# Patient Record
Sex: MALE | Race: OTHER | Employment: FULL TIME | URBAN - NONMETROPOLITAN AREA
[De-identification: names, ages, dates, MRNs, and addresses within clinical notes are randomized per-mention and may not be internally consistent; named-entity substitution may affect disease eponyms.]

---

## 2021-07-21 ENCOUNTER — TELEPHONE (OUTPATIENT)
Dept: PRIMARY CARE CLINIC | Age: 46
End: 2021-07-21

## 2021-07-21 NOTE — TELEPHONE ENCOUNTER
Received call from [address] from [facility] stating that this patient has an MRI scheduled. He has never been seen here and there is no order from you or anyone else in the practice PA department is needing approval of course. Please advise how this should be handled.

## 2021-07-22 ENCOUNTER — TELEMEDICINE (OUTPATIENT)
Dept: PRIMARY CARE CLINIC | Age: 46
End: 2021-07-22
Payer: COMMERCIAL

## 2021-07-22 DIAGNOSIS — G89.29 CHRONIC LUMBOSACRAL PAIN: Primary | ICD-10-CM

## 2021-07-22 DIAGNOSIS — M54.17 LUMBOSACRAL RADICULOPATHY: ICD-10-CM

## 2021-07-22 DIAGNOSIS — M54.50 CHRONIC LUMBOSACRAL PAIN: Primary | ICD-10-CM

## 2021-07-22 PROCEDURE — 99203 OFFICE O/P NEW LOW 30 MIN: CPT | Performed by: FAMILY MEDICINE

## 2021-07-22 NOTE — PROGRESS NOTES
2021    TELEHEALTH EVALUATION -- Audio/Visual (During [id number]-[id number] public health emergency)    HPI:    Santy Noriega (:  1975) has requested an audio/video evaluation for the following concern(s):    Patient presents today via video visit for back pain and establishing today  It is chronic pain of the lumbar philippe, ongoing greater than 6 months and has failed therapy. Pain hasn't improved w/ NSAIDs. No injury to precipitate it either. Previously rather healthy w/o major surgical hx other than appy. Review of Systems   Constitutional: Negative for chills, fever and unexpected weight change. Respiratory: Negative for cough, chest tightness, shortness of breath and wheezing. Cardiovascular: Negative for chest pain, palpitations and leg swelling. Gastrointestinal: Negative for abdominal pain, constipation, diarrhea, nausea and vomiting. Genitourinary: Negative for difficulty urinating, dysuria, enuresis and frequency. Musculoskeletal: Positive for back pain and gait problem (from back pain). Neurological: Negative for weakness and numbness.        Prior to Visit Medications    Not on File       Social History     Tobacco Use    Smoking status: Not on file   Substance Use Topics    Alcohol use: Not on file    Drug use: Not on file        Not on File, No past medical history on file., No past surgical history on file.,   Social History     Tobacco Use    Smoking status: Not on file   Substance Use Topics    Alcohol use: Not on file    Drug use: Not on file   , No family history on file.,   There is no immunization history on file for this patient.,   Health Maintenance   Topic Date Due    Hepatitis C screen  Never done    COVID-19 Vaccine (1) Never done    HIV screen  Never done    DTaP/Tdap/Td vaccine (1 - Tdap) Never done    Lipid screen  Never done    Flu vaccine (1) 2021    Hepatitis A vaccine  Aged Out    Hepatitis B vaccine  Aged Out    Hib vaccine  Aged Out    Meningococcal (ACWY) vaccine  Aged Out    Pneumococcal 0-64 years Vaccine  Aged Out       PHYSICAL EXAMINATION:  [ INSTRUCTIONS:  \"[x]\" Indicates a positive item  \"[]\" Indicates a negative item  -- DELETE ALL ITEMS NOT EXAMINED]  Vital Signs: (As obtained by patient/caregiver or practitioner observation)    Blood pressure-  Heart rate-    Respiratory rate-    Temperature-  Pulse oximetry-     Constitutional: [x] Appears well-developed and well-nourished [] No apparent distress      [] Abnormal-   Mental status  [x] Alert and awake  [x] Oriented to person/place/time [x]Able to follow commands      Eyes:  EOM    [x]  Normal  [] Abnormal-  Sclera  [x]  Normal  [] Abnormal -         Discharge []  None visible  [] Abnormal -    HENT:   [x] Normocephalic, atraumatic. [] Abnormal   [x] Mouth/Throat: Mucous membranes are moist.     External Ears [x] Normal  [] Abnormal-     Neck: [x] No visualized mass     Pulmonary/Chest: [x] Respiratory effort normal.  [x] No visualized signs of difficulty breathing or respiratory distress        [] Abnormal-      Musculoskeletal:   [x] Normal gait with no signs of ataxia         [x] Normal range of motion of neck        [] Abnormal-       Neurological:        [x] No Facial Asymmetry (Cranial nerve 7 motor function) (limited exam to video visit)          [x] No gaze palsy        [] Abnormal-         Skin:        [x] No significant exanthematous lesions or discoloration noted on facial skin         [] Abnormal-            Psychiatric:       [x] Normal Affect [x] No Hallucinations        [] Abnormal-     Other pertinent observable physical exam findings-     ASSESSMENT/PLAN:    ICD-10-CM    1. Chronic lumbosacral pain  M54.5 MRI LUMBAR SPINE WO CONTRAST    G89.29    2. Lumbosacral radiculopathy  M54.17 MRI LUMBAR SPINE WO CONTRAST       MRI based on chronic nature w/ radiulopathy and failing basic PT at home directed by provider as well as NSAIDs and watchful waiting.   PT may be I have personally seen and examined this patient.  I have fully participated in the care of this patient. I have reviewed all pertinent clinical information, including history, physical exam, plan and the Resident’s note and agree except as noted.

## 2021-07-23 ENCOUNTER — HOSPITAL ENCOUNTER (OUTPATIENT)
Dept: MRI IMAGING | Age: 46
Discharge: HOME | End: 2021-07-23
Payer: COMMERCIAL

## 2021-07-23 DIAGNOSIS — M54.50 CHRONIC LUMBOSACRAL PAIN: ICD-10-CM

## 2021-07-23 DIAGNOSIS — M54.17 LUMBOSACRAL RADICULOPATHY: ICD-10-CM

## 2021-07-23 DIAGNOSIS — G89.29 CHRONIC LUMBOSACRAL PAIN: ICD-10-CM

## 2021-07-23 PROCEDURE — 72148 MRI LUMBAR SPINE W/O DYE: CPT

## 2021-07-23 ASSESSMENT — ENCOUNTER SYMPTOMS
BACK PAIN: 1
CONSTIPATION: 0
SHORTNESS OF BREATH: 0
ABDOMINAL PAIN: 0
DIARRHEA: 0
VOMITING: 0
WHEEZING: 0
CHEST TIGHTNESS: 0
COUGH: 0
NAUSEA: 0

## 2021-07-26 ENCOUNTER — TELEPHONE (OUTPATIENT)
Dept: PRIMARY CARE CLINIC | Age: 46
End: 2021-07-26

## 2021-07-26 NOTE — TELEPHONE ENCOUNTER
Qiana Stephens from [id number] [facility] department left message regarding patients MRI and it has been denied by insurance

## 2021-08-02 DIAGNOSIS — M54.9 BACK PAIN, UNSPECIFIED BACK LOCATION, UNSPECIFIED BACK PAIN LATERALITY, UNSPECIFIED CHRONICITY: Primary | ICD-10-CM

## 2021-08-03 ENCOUNTER — OFFICE VISIT (OUTPATIENT)
Dept: NEUROSURGERY | Age: 46
End: 2021-08-03
Payer: COMMERCIAL

## 2021-08-03 VITALS
HEART RATE: 56 BPM | DIASTOLIC BLOOD PRESSURE: 69 MMHG | WEIGHT: 164 LBS | SYSTOLIC BLOOD PRESSURE: 112 MMHG | OXYGEN SATURATION: 97 % | HEIGHT: 71 IN | BODY MASS INDEX: 22.96 KG/M2

## 2021-08-03 DIAGNOSIS — M51.36 DDD (DEGENERATIVE DISC DISEASE), LUMBAR: Primary | ICD-10-CM

## 2021-08-03 DIAGNOSIS — M54.17 LUMBOSACRAL RADICULOPATHY: Primary | ICD-10-CM

## 2021-08-03 DIAGNOSIS — R26.89 ANTALGIC GAIT: ICD-10-CM

## 2021-08-03 DIAGNOSIS — G89.29 CHRONIC MIDLINE LOW BACK PAIN WITHOUT SCIATICA: ICD-10-CM

## 2021-08-03 DIAGNOSIS — M54.50 CHRONIC LUMBOSACRAL PAIN: ICD-10-CM

## 2021-08-03 DIAGNOSIS — G89.29 CHRONIC LUMBOSACRAL PAIN: ICD-10-CM

## 2021-08-03 DIAGNOSIS — M48.061 LUMBAR FORAMINAL STENOSIS: ICD-10-CM

## 2021-08-03 DIAGNOSIS — M54.50 CHRONIC MIDLINE LOW BACK PAIN WITHOUT SCIATICA: ICD-10-CM

## 2021-08-03 PROCEDURE — 99203 OFFICE O/P NEW LOW 30 MIN: CPT | Performed by: NEUROLOGICAL SURGERY

## 2021-08-03 RX ORDER — METHOCARBAMOL 750 MG/1
750 TABLET, FILM COATED ORAL 3 TIMES DAILY PRN
Qty: 90 TABLET | Refills: 1 | Status: SHIPPED | OUTPATIENT
Start: 2021-08-03 | End: 2021-09-02

## 2021-08-03 RX ORDER — NAPROXEN 500 MG/1
500 TABLET ORAL 2 TIMES DAILY WITH MEALS
Qty: 60 TABLET | Refills: 1 | Status: SHIPPED | OUTPATIENT
Start: 2021-08-03

## 2021-08-03 RX ORDER — METHYLPREDNISOLONE 4 MG/1
TABLET ORAL
Qty: 1 KIT | Refills: 0 | Status: SHIPPED | OUTPATIENT
Start: 2021-08-03 | End: 2021-08-09

## 2021-08-03 ASSESSMENT — ENCOUNTER SYMPTOMS
GASTROINTESTINAL NEGATIVE: 1
RESPIRATORY NEGATIVE: 1
EYES NEGATIVE: 1
BACK PAIN: 1

## 2021-08-03 NOTE — PROGRESS NOTES
Referral has been placed to neurosurgery and naproxen 500mg bid has been sent to [facility].      Patient is scheduled today with neuro at 2pm

## 2021-08-03 NOTE — PROGRESS NOTES
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 The pain does not radiate. His pain is mostly located in the low back. The patient denies numbness. The pain worsens with prolonged standing. When he flexes forward he will have a shock like sensation in the back and have to sit down to relieve the pain. The patient has underwent a non-operative treatment course that has included:  NSAIDs (Naproxen)  Flexeril - made him too drowsy   Chiropractic therapy in the past      Of note he does not use tobacco and does not take blood thinning medications. History reviewed. No pertinent past medical history. History reviewed. No pertinent surgical history. Current Outpatient Medications   Medication Sig Dispense Refill    naproxen (NAPROSYN) 500 MG tablet Take 1 tablet by mouth 2 times daily (with meals) 60 tablet 1    methylPREDNISolone (MEDROL, [place],) 4 MG tablet Take by mouth as directed on package 1 kit 0    methocarbamol (ROBAXIN-750) 750 MG tablet Take 1 tablet by mouth 3 times daily as needed (spasm) 90 tablet 1     No current facility-administered medications for this visit. Allergies:  Patient has no known allergies. Social History:   Social History     Tobacco Use   Smoking Status Not on file     Social History     Substance and Sexual Activity   Alcohol Use None         Family History:   History reviewed. No pertinent family history. REVIEW OF SYSTEMS:  Constitutional: Negative. HENT: Negative. Eyes: Negative. Respiratory: Negative. Cardiovascular: Negative. Gastrointestinal: Negative. Genitourinary: Negative. Musculoskeletal: Positive for back pain. Skin: Negative.     Neurological: Negative. Endo/Heme/Allergies: Negative. Psychiatric/Behavioral: Negative.         PHYSICAL EXAM:  Vitals:    08/03/21 1441   BP: 112/69   Pulse: 56   SpO2: 97%     Constitutional: appears well-developed and well-nourished. Eyes - conjunctiva normal.  Pupils react to light  Ear, nose, throat - hearing intact to finger rub, No scars, masses, or lesions over external nose or ears, no atrophy oftongue  Neck- symmetric, no masses noted, no jugular vein distension  Respiration- chest wall appears symmetric, good expansion, normal effort without use of accessory muscles  Musculoskeletal - no significant wasting of muscles noted, no bony deformities, gait no gross ataxia  Extremities- no clubbing, cyanosis oredema  Skin - warm, dry, and intact. No rash, erythema, or pallor. Psychiatric - mood, affect, and behavior appear normal.     Neurologic Examination  Awake, Alert and oriented x 4  Normal speech pattern, following commands    Motor:  RIGHT:     iliopsoas 5/5    knee flexor 5/5    knee extension 5/5    EHL/dorsiflexion 5/5    plantar flexion 5/5    LEFT:      iliopsoas 5/5    knee flexor 5/5    knee extension 5/5    EHL/dorsiflexion 5/5    plantar flexion 5/5    No deficits to light touch   Reflexes are 2+ and symmetric  No myofacial tenderness to palpation  Antalgic Gait pattern        DATA and IMAGING:    Nursing/pcp notes, imaging, labs, and vitals reviewed.      PT,OT and/or speech notes reviewed    No results found for: WBC, HGB, HCT, MCV, PLTNo results found for: NA, K, CL, CO2, BUN, CREATININE, GLUCOSE, CALCIUM, PROT, LABALBU, BILITOT, ALKPHOS, AST, ALT, LABGLOM, GFRAA, AGRATIO, GLOBNo results found for: INR, PROTIME    MRI LUMBAR SPINE WO CONTRAST 7/23/2021 6:32 PM   HISTORY: M54.5   COMPARISON: None    TECHNIQUE: Multiplanar, multisequence MRI of the lumbar spine was   performed without the use of contrast.   FINDINGS:    Alignment: There are presumed to be 5 lumbar-type vertebrae, with the   most inferior being labeled as L5. Normal lumbar lordosis is   maintained. There is no evidence of listhesis or subluxation. Degenerative disc disease is noted at L1-L2, L4-L5 and L5-S1 with   narrowing of the disc space height and loss of hydration on the more   heavily T2 weighted images. Marrow signal: No pathologic marrow infiltrate is demonstrated. The   vertebral body heights and posterior elements are maintained. Cord/Canal: The conus medullaris terminates at the level of L1. The   spinal cord is normal in signal and morphology. Soft tissues: The surrounding soft tissues are unremarkable. Levels:    L1-L2: No disc bulge is present. No significant neuroforaminal or   central canal stenosis is seen. L2-L3: There is mild bulging of the disc as well as mild facet and   ligamentous hypertrophy. No evidence of central or foraminal   stenosis. Blanchie Bevels L3-L4: There is mild bulging of the disc as well as mild facet and   ligamentous hypertrophy. No evidence of central or foraminal   stenosis. Blanchie Bevels L4-L5: There is broad-based bulging of the disc with ventral   indentation of the thecal sac and mild central stenosis. Mild facet   and ligamentous hypertrophy. Mild left-sided foraminal narrowing is   present. Blanchie Bevels L5-S1: There is bulging of the disc with a small central disc   protrusion with minimal ventral indentation of the thecal sac. No   evidence of central stenosis. Mild bilateral neural foraminal   narrowing is present. .        Impression   1. Degenerative disc disease at the L1-L2, L4-L5 and L5-S1 levels. There is bulging of the disc at the lower 3 lumbar disc levels. There   is mild central stenosis at L4-L5 as well as mild foraminal narrowing   at the L4-L5 and L5-S1 level.    Signed by Dr Jazmin Martinez   I have personally reviewed these images and my interpretation is:  DDD L4-S1  There is mild to moderate foraminal stenosis at L5-S1 on the right      ASSESSMENT:    Jl Douglas is a 41 y.o. male with complaints of low back pain with no radicular pains. ICD-10-CM    1. DDD (degenerative disc disease), lumbar  M51.36 [facility]   2. Lumbar foraminal stenosis  M48.061    3. Chronic midline low back pain without sciatica  M54.5 [facility]    G89.29    4. Antalgic gait  R26.89 [facility]       PLAN:  We have discussed and reviewed the results of the MRI lumbar spine with Dr. Ratna Xiao at length. We explained that his degenerative changes in his spine was relatively mild.     -Start Methocarbamol   -Medrol dose pack   -Start PT Shanita  -Follow up as needed           Macho Smith, DO

## 2021-08-11 ENCOUNTER — HOSPITAL ENCOUNTER (OUTPATIENT)
Dept: PHYSICAL THERAPY | Age: 46
Setting detail: THERAPIES SERIES
Discharge: HOME | End: 2021-08-11
Payer: COMMERCIAL

## 2021-08-11 PROCEDURE — 97161 PT EVAL LOW COMPLEX 20 MIN: CPT

## 2021-08-13 ASSESSMENT — PAIN DESCRIPTION - DESCRIPTORS: DESCRIPTORS: TIGHTNESS;ACHING

## 2021-08-13 ASSESSMENT — PAIN DESCRIPTION - PAIN TYPE: TYPE: ACUTE PAIN;CHRONIC PAIN

## 2021-08-13 ASSESSMENT — PAIN DESCRIPTION - ORIENTATION: ORIENTATION: LOWER

## 2021-08-13 ASSESSMENT — PAIN DESCRIPTION - LOCATION: LOCATION: BACK

## 2021-08-13 NOTE — PROGRESS NOTES
Physical Therapy  Initial Assessment  Date: 2021  Patient Name: Padilla Harrison  MRN: [id number]  : 1975     Treatment Diagnosis: Lumbar DDD    Restrictions       Subjective   General  Chart Reviewed: Yes  Patient assessed for rehabilitation services?: Yes  Additional Pertinent Hx: 40 y/o M presents with complaint of low back pain. PMH unremarkable  Response To Previous Treatment: Not applicable  Family / Caregiver Present: No  Referring Practitioner: Leeroy Osman DO  Referral Date : 21  Diagnosis: Lumbar DDD  Follows Commands: Within Functional Limits  PT Visit Information  PT Insurance Information: [employer]  Total # of Visits Approved: 12  Total # of Visits to Date: 1  Plan of Care/Certification Expiration Date: 21  Progress Note Due Date: 09/10/21  Subjective  Subjective: Pt presents with complaint of low back pain. 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 He had reached a point where standing and ambulation was intolerable (\"I called in to work. I haven't done that in 10 years. \") but after medrol dosepak, is feeling better. Just finished that within the past few days. Has also had NSAIDS and muscle relaxers, with some relief from those. Pain Screening  Patient Currently in Pain: Yes  Pain Assessment  Pain Assessment: 0-10  Pain Type: Acute pain;Chronic pain  Pain Location: Back  Pain Orientation: Lower  Pain Descriptors: Tightness; Aching  Vital Signs  Patient Currently in Pain: Yes    Vision/Hearing  Vision  Vision: Within Functional Limits  Hearing  Hearing: Within functional limits    Orientation  Orientation  Overall Orientation Status: Within Normal Limits    Social/Functional History  Social/Functional History  ADL Assistance: Independent  Ambulation Assistance: Independent  Active : Yes  Occupation: Full time employment  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 Observation: No noted leg length discrepancy or pelvic obliquity. Fairly flattened lumbar lordosis. Spine  Lumbar: 60 degrees flex, 20 degrees ext, 20 degrees SB R, 20 degrees SB L, 100% rotation bilat    Strength RLE  R Hip Flexion: 5/5  R Hip Extension: 5/5  R Hip ABduction: 5/5  R Hip ADduction: 5/5  R Knee Flexion: 5/5  R Knee Extension: 5/5  R Ankle Dorsiflexion: 5/5  R Ankle Plantar flexion: 5/5  Strength LLE  L Hip Flexion: 5/5  L Hip Extension: 5/5  L Hip ABduction: 5/5  L Hip ADduction: 5/5  L Knee Flexion: 5/5  L Knee Extension: 5/5  L Ankle Dorsiflexion: 5/5  L Ankle Plantar Flexion: 5/5  Strength Other  Other: Fair TA contraction and pelvic tilt, though poor breath control. Additional Measures  Flexibility: Bilat HS flexibility to at least 75 degrees.   Special Tests: (-)SLR, (-)KATTY  Other: Oswestry Disability Questionnaire: 18% impairment  Sensation  Overall Sensation Status: WNL             Ambulation  Ambulation?: Yes  Ambulation 1  Surface: level tile  Device: No Device  Assistance: Independent  Quality of Gait: No gross abnormalities noted     Exercises  Exercise 1: TA contraction (alone, UE, LE, UE/LE)  Exercise 2: Pelvic tilt  Exercise 3: Bridging/Single leg bridging  Exercise 4: Partial abdominal/oblique curls  Exercise 5: Hooklying lumbar rotation  Exercise 6: Supine quadratus stretch  Exercise 7: Supine piriformis stretch  Exercise 8: SKTC/DKTC  Exercise 9: Contract/relax bilat HS and hip ER  Exercise 10: Sitting glute med stretch (bent LE crossed over straightened LE, trunk rotation to side of bent LE)  Exercise 11: Repeated sit to stand with TA contraction  Exercise 12: Sitting fwd reach (also to R, to L)  Exercise 13: Multifidus row/Paloff press  Exercise 14: Lat pulldowns  Exercise 15: Standing hip abd/ext  Exercise 16: Standing fwd flexion  Exercise 17: Standing reach to L, to R  Exercise 18: Plank/Side plank (focus on proper form)  Exercise 19: If having pain/spasm, can trial IFC + MH                      Assessment   Conditions Requiring Skilled Therapeutic Intervention  Body structures, Functions, Activity limitations: Increased pain;Decreased posture;Decreased strength;Decreased high-level IADLs  Assessment: Pt presents with c/o low back pain and tightness and demonstrates decreased core strength and flexibility. Will benefit from skilled PT intervention to address listed impairments, with intent to issue an HEP that pt is capable of performing between/after cases to provide lumbar stretching. Treatment Diagnosis: Lumbar DDD  Prognosis: Excellent  Decision Making: Low Complexity  Clinical Presentation: stable  REQUIRES PT FOLLOW UP: Yes  Discharge Recommendations: Continue to assess pending progress  Activity Tolerance  Activity Tolerance: Patient Tolerated treatment well         Plan   Plan  Times per week: 2x  Plan weeks: 4-6 weeks  Current Treatment Recommendations: Strengthening, ROM, Home Exercise Program, Modalities, Patient/Caregiver Education & Training    Goals  Short term goals  Time Frame for Short term goals: 3-4 weeks  Short term goal 1: Independent with HEP  Short term goal 2: Perform 10 good TA contractions with good breath control. Short term goal 3: Improve bilat HS flexiblity to greater than 90 degrees. Long term goals  Time Frame for Long term goals : 4-6 weeks  Long term goal 1: Report improved awareness of TA contraction during prolonged standing. Long term goal 2: Report decreased \"shocking\" in low back. Long term goal 3: Report lessened feelings of low back cramping/tightness after surgeries.   Long term goal 4: Improve Oswestry score to 8% impairment or less.   Patient Goals   Patient goals : reduce back pain/tightess       Therapy Time   Individual Concurrent Group Co-treatment   Time In 0804         Time Out 1655         Minutes 35            Electronically signed by Mckenna Mcgill PT on 8/13/2021 at 1:36 PM

## 2021-08-20 ENCOUNTER — HOSPITAL ENCOUNTER (OUTPATIENT)
Dept: PHYSICAL THERAPY | Age: 46
Setting detail: THERAPIES SERIES
Discharge: HOME | End: 2021-08-20
Payer: COMMERCIAL

## 2021-08-20 PROCEDURE — 97110 THERAPEUTIC EXERCISES: CPT

## 2021-08-20 ASSESSMENT — PAIN DESCRIPTION - ORIENTATION: ORIENTATION: LOWER

## 2021-08-20 ASSESSMENT — PAIN SCALES - GENERAL: PAINLEVEL_OUTOF10: 5

## 2021-08-20 ASSESSMENT — PAIN DESCRIPTION - LOCATION: LOCATION: BACK

## 2021-08-20 NOTE — PROGRESS NOTES
Physical Therapy  Daily Treatment Note  Date: 2021  Patient Name: Dayday Aguilar  MRN: [id number]     :   1975    Subjective:   General  Chart Reviewed: Yes  Additional Pertinent Hx: 40 y/o M presents with complaint of low back pain. PMH unremarkable  Response To Previous Treatment: Not applicable  Family / Caregiver Present: No  Referring Practitioner: Castro Harman DO  PT Visit Information  PT Insurance Information: [employer]  Total # of Visits Approved: 12  Total # of Visits to Date: 2  Plan of Care/Certification Expiration Date: 21  Subjective  Subjective: Patient states he is feeling better since last seen for PT. Most pain is in lower back, equal between sides.   Pain Screening  Patient Currently in Pain: Yes  Pain Assessment  Pain Assessment: 0-10  Pain Level: 5  Pain Location: Back  Pain Orientation: Lower  Vital Signs  Patient Currently in Pain: Yes       Treatment Activities:                                    Exercises  Exercise 1: TA contraction (alone, UE, LE, UE/LE)--10/10/10  Exercise 2: Pelvic tilt--15 reps  Exercise 3: Bridging with ball between knees--20  Exercise 4: Partial abdominal/oblique curls--20 reps (hands behind head), 20 reps opposite elbow to knee  Exercise 5: Hooklying lumbar rotation--10 bilaterally, slow and controlled  Exercise 6: Supine quadratus stretch--4 reps, 15 second hold  Exercise 7: Supine piriformis stretch--3 reps, 10 second hold  Exercise 8: SKTC/DKTC--10 reps and 10 reps  Exercise 9: Contract/relax bilat HS and hip ER--3 reps 15 second hold  Exercise 10: Sitting glute med stretch (bent LE crossed over straightened LE, trunk rotation to side of bent LE)  Exercise 11: Repeated sit to stand with TA contraction--10 reps  Exercise 12: Sitting fwd reach (also to R, to L)--5 reps, 5\" hold  Exercise 13: Multifidus row/Paloff press--blue band, 15 reps for both  Exercise 14: Lat pulldowns--not today  Exercise 15: Standing hip abd/ext--15 reps bilaterally  Exercise 16: Standing fwd flexion ('s bow)--10 reps  Exercise 17: Standing reach to L, to R  Exercise 18: Plank/Side plank (focus on proper form)--not today  Exercise 19: If having pain/spasm, can trial IFC +                                Assessment:   Conditions Requiring Skilled Therapeutic Intervention  Body structures, Functions, Activity limitations: Increased pain;Decreased posture;Decreased strength;Decreased high-level IADLs  Assessment: Mr. Bartolo Low appeared to toerate his first full session well, with some difficulty at first making his routine challenging enough. He gave good effort with his session, all exercises not performed today due to patient going to planned dinner. He reported appropriate and expected level of soreness at the conclusion but not an increase in pain. Treatment Diagnosis: Lumbar DDD  Prognosis: Excellent  Decision Making: Low Complexity  Clinical Presentation: stable  REQUIRES PT FOLLOW UP: Yes  Discharge Recommendations: Continue to assess pending progress    Goals:  Short term goals  Time Frame for Short term goals: 3-4 weeks  Short term goal 1: Independent with HEP  Short term goal 2: Perform 10 good TA contractions with good breath control. Short term goal 3: Improve bilat HS flexiblity to greater than 90 degrees. Long term goals  Time Frame for Long term goals : 4-6 weeks  Long term goal 1: Report improved awareness of TA contraction during prolonged standing. Long term goal 2: Report decreased \"shocking\" in low back. Long term goal 3: Report lessened feelings of low back cramping/tightness after surgeries. Long term goal 4: Improve Oswestry score to 8% impairment or less.   Patient Goals   Patient goals : reduce back pain/tightess  Plan:    Plan  Times per week: 2x  Plan weeks: 4-6 weeks  Current Treatment Recommendations: Strengthening, ROM, Home Exercise Program, Modalities, Patient/Caregiver Education & Training        Therapy Time   Individual Concurrent Group Co-treatment   Time In 3051         Time Out 1646         Minutes 39            Electronically signed by Barrington Valenzuela PT on 8/20/2021 at 4:59 PM

## 2021-08-26 ENCOUNTER — APPOINTMENT (OUTPATIENT)
Dept: PHYSICAL THERAPY | Age: 46
End: 2021-08-26
Payer: COMMERCIAL

## 2021-09-20 NOTE — PROGRESS NOTES
[facility] Outpatient Physical Therapy  Discharge Summary        Date:2021    Patient Ching Mediate    HYN:[id number]    :1975    Diagnosis:Diagnosis: Lumbar DDD           Total # of Visits to Date: 2     D/C ASSESSMENT: Pt attended evaluation and one further visit. It has been 30 days since his last visit and he has not contacted office or scheduled more appts. Will d/c from services at this time. GOALS:    Short term goals  Time Frame for Short term goals: 3-4 weeks  Short term goal 1: Independent with HEP  Short term goal 2: Perform 10 good TA contractions with good breath control. Short term goal 3: Improve bilat HS flexiblity to greater than 90 degrees. Long term goals  Time Frame for Long term goals : 4-6 weeks  Long term goal 1: Report improved awareness of TA contraction during prolonged standing. Long term goal 2: Report decreased \"shocking\" in low back. Long term goal 3: Report lessened feelings of low back cramping/tightness after surgeries. Long term goal 4: Improve Oswestry score to 8% impairment or less.       PLAN:  D/c from services      Electronically signed by Yeimy Mckenna PT on 2021 at 3:08 PM

## 2022-03-10 RX ORDER — SILDENAFIL 100 MG/1
TABLET, FILM COATED ORAL
Qty: 20 TABLET | Refills: 3 | OUTPATIENT
Start: 2022-03-10

## 2022-03-14 RX ORDER — SILDENAFIL 100 MG/1
TABLET, FILM COATED ORAL
Qty: 20 TABLET | Refills: 3 | OUTPATIENT
Start: 2022-03-14